# Patient Record
Sex: FEMALE | Race: WHITE | NOT HISPANIC OR LATINO | Employment: STUDENT | URBAN - METROPOLITAN AREA
[De-identification: names, ages, dates, MRNs, and addresses within clinical notes are randomized per-mention and may not be internally consistent; named-entity substitution may affect disease eponyms.]

---
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
## 2023-02-02 ENCOUNTER — PATIENT MESSAGE (OUTPATIENT)
Dept: PEDIATRICS | Facility: CLINIC | Age: 18
End: 2023-02-02
Payer: MEDICAID

## 2023-05-25 DIAGNOSIS — N92.6 IRREGULAR MENSES: ICD-10-CM

## 2023-05-25 RX ORDER — NORGESTIMATE AND ETHINYL ESTRADIOL 7DAYSX3 LO
1 KIT ORAL DAILY
Qty: 84 TABLET | Refills: 0 | Status: SHIPPED | OUTPATIENT
Start: 2023-05-25 | End: 2023-07-20 | Stop reason: SDUPTHER

## 2023-07-20 ENCOUNTER — OFFICE VISIT (OUTPATIENT)
Dept: UROGYNECOLOGY | Facility: CLINIC | Age: 18
End: 2023-07-20
Payer: MEDICAID

## 2023-07-20 VITALS
SYSTOLIC BLOOD PRESSURE: 120 MMHG | HEIGHT: 65 IN | DIASTOLIC BLOOD PRESSURE: 70 MMHG | BODY MASS INDEX: 22.77 KG/M2 | WEIGHT: 136.69 LBS

## 2023-07-20 DIAGNOSIS — Z01.419 WELL WOMAN EXAM: Primary | ICD-10-CM

## 2023-07-20 DIAGNOSIS — N92.6 IRREGULAR MENSES: ICD-10-CM

## 2023-07-20 PROCEDURE — 1159F PR MEDICATION LIST DOCUMENTED IN MEDICAL RECORD: ICD-10-PCS | Mod: CPTII,,, | Performed by: NURSE PRACTITIONER

## 2023-07-20 PROCEDURE — 1159F MED LIST DOCD IN RCRD: CPT | Mod: CPTII,,, | Performed by: NURSE PRACTITIONER

## 2023-07-20 PROCEDURE — 99395 PREV VISIT EST AGE 18-39: CPT | Mod: S$PBB,,, | Performed by: NURSE PRACTITIONER

## 2023-07-20 PROCEDURE — 3008F PR BODY MASS INDEX (BMI) DOCUMENTED: ICD-10-PCS | Mod: CPTII,,, | Performed by: NURSE PRACTITIONER

## 2023-07-20 PROCEDURE — 99999 PR PBB SHADOW E&M-EST. PATIENT-LVL III: ICD-10-PCS | Mod: PBBFAC,,, | Performed by: NURSE PRACTITIONER

## 2023-07-20 PROCEDURE — 99395 PR PREVENTIVE VISIT,EST,18-39: ICD-10-PCS | Mod: S$PBB,,, | Performed by: NURSE PRACTITIONER

## 2023-07-20 PROCEDURE — 99213 OFFICE O/P EST LOW 20 MIN: CPT | Mod: PBBFAC | Performed by: NURSE PRACTITIONER

## 2023-07-20 PROCEDURE — 3078F PR MOST RECENT DIASTOLIC BLOOD PRESSURE < 80 MM HG: ICD-10-PCS | Mod: CPTII,,, | Performed by: NURSE PRACTITIONER

## 2023-07-20 PROCEDURE — 3074F PR MOST RECENT SYSTOLIC BLOOD PRESSURE < 130 MM HG: ICD-10-PCS | Mod: CPTII,,, | Performed by: NURSE PRACTITIONER

## 2023-07-20 PROCEDURE — 1160F RVW MEDS BY RX/DR IN RCRD: CPT | Mod: CPTII,,, | Performed by: NURSE PRACTITIONER

## 2023-07-20 PROCEDURE — 1160F PR REVIEW ALL MEDS BY PRESCRIBER/CLIN PHARMACIST DOCUMENTED: ICD-10-PCS | Mod: CPTII,,, | Performed by: NURSE PRACTITIONER

## 2023-07-20 PROCEDURE — 3008F BODY MASS INDEX DOCD: CPT | Mod: CPTII,,, | Performed by: NURSE PRACTITIONER

## 2023-07-20 PROCEDURE — 3074F SYST BP LT 130 MM HG: CPT | Mod: CPTII,,, | Performed by: NURSE PRACTITIONER

## 2023-07-20 PROCEDURE — 3078F DIAST BP <80 MM HG: CPT | Mod: CPTII,,, | Performed by: NURSE PRACTITIONER

## 2023-07-20 PROCEDURE — 99999 PR PBB SHADOW E&M-EST. PATIENT-LVL III: CPT | Mod: PBBFAC,,, | Performed by: NURSE PRACTITIONER

## 2023-07-20 RX ORDER — NORGESTIMATE AND ETHINYL ESTRADIOL 7DAYSX3 LO
1 KIT ORAL DAILY
Qty: 84 TABLET | Refills: 4 | Status: SHIPPED | OUTPATIENT
Start: 2023-07-20 | End: 2023-09-26

## 2023-07-20 NOTE — PROGRESS NOTES
07/20/2023    SUBJECTIVE:   17 y.o. female for annual exam    History reviewed. No pertinent past medical history.    Past Surgical History:   Procedure Laterality Date    TONSILLECTOMY, ADENOIDECTOMY      2 years old       Current Outpatient Medications   Medication Sig Dispense Refill    ondansetron (ZOFRAN-ODT) 4 MG TbDL Take 4 mg by mouth every 8 (eight) hours as needed.      RETIN-A 0.05 % cream Apply 1 application topically nightly.      spironolactone (ALDACTONE) 25 MG tablet Take 25 mg by mouth once daily.      norgestimate-ethinyl estradioL (TRI-LO-ESTARYLLA) 0.18/0.215/0.25 mg-25 mcg tablet Take 1 tablet by mouth once daily. 84 tablet 4     No current facility-administered medications for this visit.     Allergies: Patient has no known allergies.   Patient's last menstrual period was 06/20/2023.    Menses normal on ocp's .      Prior to ocp's:Menses every 6-9 weeks lasting 5-6 days.  Denies dysmenorrhea.      Family History  Family History   Problem Relation Age of Onset    Diabetes Maternal Grandmother     Heart disease Maternal Grandmother     Hyperlipidemia Maternal Grandmother     Blindness Maternal Grandmother     Hyperlipidemia Maternal Grandfather     Glaucoma Maternal Grandfather     Cataracts Maternal Grandfather     Strabismus Brother     Blindness Maternal Aunt     Retinal detachment Paternal Uncle     Retinal detachment Paternal Grandfather     Thyroid disease Neg Hx     Stroke Neg Hx     Macular degeneration Neg Hx     Hypertension Neg Hx     Amblyopia Neg Hx         ROS:  Feeling well.   No dyspnea or chest pain on exertion.    No abdominal pain, change in bowel habits, black or bloody stools.  + constipation intermittently  No urinary tract symptoms.   GYN ROS: no breast pain or new or enlarging lumps on self exam, normal menses.   No neurological complaints.    OBJECTIVE:   The patient appears well, alert, oriented x 3, in no distress.  /70 (BP Location: Left arm, Patient Position:  "Sitting, BP Method: Medium (Manual))   Ht 5' 5" (1.651 m)   Wt 62 kg (136 lb 11 oz)   LMP 06/20/2023   BMI 22.75 kg/m²   Abdomen soft without tenderness, guarding, mass or organomegaly.   Extremities show no edema, normal peripheral pulses.   Neurological is normal, no focal findings.      BREAST EXAM: breasts appear normal, no suspicious masses, no skin or nipple changes or axillary nodes     PELVIC EXAM:   VULVA: normal appearing vulva with no masses, tenderness or lesions,   VAGINA: normal appearing vagina with normal color and discharge, no lesions,   CERVIX: normal appearing cervix without discharge or lesions,   UTERUS: uterus is normal size, shape, consistency and nontender,   ADNEXA: no masses,   RECTAL: deferred     ASSESSMENT:   1. Well woman exam        2. Irregular menses  norgestimate-ethinyl estradioL (TRI-LO-ESTARYLLA) 0.18/0.215/0.25 mg-25 mcg tablet            PLAN:       1. Irregular menses  --continue ocp's  --let me know if you would like an IUD  --will need to get urine test for gonorrhea/ chlamycia  --will time to insert when you are on your cycle    2. Constipation  --continue to control    3.  RTC 1 year for annual    25 minutes were spent in face to face time with this patient  90 % of this time was spent in counseling and/or coordination of care    Jasmyne HEMPHILL [facility]  Division of Female Pelvic Medicine and Reconstructive Surgery  Department of Obstetrics & Gynecology          "

## 2023-07-20 NOTE — PATIENT INSTRUCTIONS
1. Irregular menses  --continue ocp's  --let me know if you would like an IUD  --will need to get urine test for gonorrhea/ chlamycia  --will time to insert when you are on your cycle    2. Constipation  --continue to control    3.  RTC 1 year for annual

## 2023-09-26 ENCOUNTER — OFFICE VISIT (OUTPATIENT)
Dept: UROGYNECOLOGY | Facility: CLINIC | Age: 18
End: 2023-09-26
Payer: MEDICAID

## 2023-09-26 VITALS
DIASTOLIC BLOOD PRESSURE: 70 MMHG | HEIGHT: 65 IN | BODY MASS INDEX: 21.16 KG/M2 | WEIGHT: 127 LBS | SYSTOLIC BLOOD PRESSURE: 120 MMHG

## 2023-09-26 DIAGNOSIS — N92.1 METRORRHAGIA: Primary | ICD-10-CM

## 2023-09-26 DIAGNOSIS — K59.00 CONSTIPATION, UNSPECIFIED CONSTIPATION TYPE: ICD-10-CM

## 2023-09-26 PROCEDURE — 99213 OFFICE O/P EST LOW 20 MIN: CPT | Mod: S$PBB,,, | Performed by: NURSE PRACTITIONER

## 2023-09-26 PROCEDURE — 99999 PR PBB SHADOW E&M-EST. PATIENT-LVL III: CPT | Mod: PBBFAC,,, | Performed by: NURSE PRACTITIONER

## 2023-09-26 PROCEDURE — 1159F PR MEDICATION LIST DOCUMENTED IN MEDICAL RECORD: ICD-10-PCS | Mod: CPTII,,, | Performed by: NURSE PRACTITIONER

## 2023-09-26 PROCEDURE — 3078F PR MOST RECENT DIASTOLIC BLOOD PRESSURE < 80 MM HG: ICD-10-PCS | Mod: CPTII,,, | Performed by: NURSE PRACTITIONER

## 2023-09-26 PROCEDURE — 1159F MED LIST DOCD IN RCRD: CPT | Mod: CPTII,,, | Performed by: NURSE PRACTITIONER

## 2023-09-26 PROCEDURE — 3074F SYST BP LT 130 MM HG: CPT | Mod: CPTII,,, | Performed by: NURSE PRACTITIONER

## 2023-09-26 PROCEDURE — 3008F PR BODY MASS INDEX (BMI) DOCUMENTED: ICD-10-PCS | Mod: CPTII,,, | Performed by: NURSE PRACTITIONER

## 2023-09-26 PROCEDURE — 3008F BODY MASS INDEX DOCD: CPT | Mod: CPTII,,, | Performed by: NURSE PRACTITIONER

## 2023-09-26 PROCEDURE — 1160F PR REVIEW ALL MEDS BY PRESCRIBER/CLIN PHARMACIST DOCUMENTED: ICD-10-PCS | Mod: CPTII,,, | Performed by: NURSE PRACTITIONER

## 2023-09-26 PROCEDURE — 3074F PR MOST RECENT SYSTOLIC BLOOD PRESSURE < 130 MM HG: ICD-10-PCS | Mod: CPTII,,, | Performed by: NURSE PRACTITIONER

## 2023-09-26 PROCEDURE — 99213 OFFICE O/P EST LOW 20 MIN: CPT | Mod: PBBFAC | Performed by: NURSE PRACTITIONER

## 2023-09-26 PROCEDURE — 1160F RVW MEDS BY RX/DR IN RCRD: CPT | Mod: CPTII,,, | Performed by: NURSE PRACTITIONER

## 2023-09-26 PROCEDURE — 99213 PR OFFICE/OUTPT VISIT, EST, LEVL III, 20-29 MIN: ICD-10-PCS | Mod: S$PBB,,, | Performed by: NURSE PRACTITIONER

## 2023-09-26 PROCEDURE — 3078F DIAST BP <80 MM HG: CPT | Mod: CPTII,,, | Performed by: NURSE PRACTITIONER

## 2023-09-26 PROCEDURE — 99999 PR PBB SHADOW E&M-EST. PATIENT-LVL III: ICD-10-PCS | Mod: PBBFAC,,, | Performed by: NURSE PRACTITIONER

## 2023-09-26 RX ORDER — LEVONORGESTREL AND ETHINYL ESTRADIOL 0.1-0.02MG
1 KIT ORAL DAILY
Qty: 84 TABLET | Refills: 3 | Status: SHIPPED | OUTPATIENT
Start: 2023-09-26 | End: 2024-09-25

## 2023-09-26 NOTE — PROGRESS NOTES
09/26/2023    SUBJECTIVE:   17 y.o. female for metrorrhagia despite ocp's    History reviewed. No pertinent past medical history.    Past Surgical History:   Procedure Laterality Date    TONSILLECTOMY, ADENOIDECTOMY      2 years old       Current Outpatient Medications   Medication Sig Dispense Refill    ondansetron (ZOFRAN-ODT) 4 MG TbDL Take 4 mg by mouth every 8 (eight) hours as needed.      levonorgestrel-ethinyl estradiol (AVIANE,ALESSE,LESSINA) 0.1-20 mg-mcg per tablet Take 1 tablet by mouth once daily. 84 tablet 3    RETIN-A 0.05 % cream Apply 1 application topically nightly.      spironolactone (ALDACTONE) 25 MG tablet Take 25 mg by mouth once daily.       No current facility-administered medications for this visit.     Allergies: Patient has no known allergies.   Patient's last menstrual period was 09/10/2023.    Menses normal on ocp's .      Prior to ocp's:Menses every 6-9 weeks lasting 5-6 days.+ metrorrhagia x firt 3 days (super plus tampon in 45 minutes)      Family History  Family History   Problem Relation Age of Onset    Diabetes Maternal Grandmother     Heart disease Maternal Grandmother     Hyperlipidemia Maternal Grandmother     Blindness Maternal Grandmother     Hyperlipidemia Maternal Grandfather     Glaucoma Maternal Grandfather     Cataracts Maternal Grandfather     Strabismus Brother     Blindness Maternal Aunt     Retinal detachment Paternal Uncle     Retinal detachment Paternal Grandfather     Thyroid disease Neg Hx     Stroke Neg Hx     Macular degeneration Neg Hx     Hypertension Neg Hx     Amblyopia Neg Hx         ROS:  Feeling well.   No dyspnea or chest pain on exertion.    No abdominal pain, change in bowel habits, black or bloody stools.  + constipation intermittently  No urinary tract symptoms.   GYN ROS: no breast pain or new or enlarging lumps on self exam, metrorrhagia  No neurological complaints.    OBJECTIVE:   The patient appears well, alert, oriented x 3, in no  "distress.  /70 (BP Location: Left arm, Patient Position: Sitting, BP Method: Medium (Manual))   Ht 5' 5" (1.651 m)   Wt 57.6 kg (126 lb 15.8 oz)   LMP 09/10/2023   BMI 21.13 kg/m²   Abdomen soft without tenderness, guarding, mass or organomegaly.   Extremities show no edema, normal peripheral pulses.   Neurological is normal, no focal findings.          PELVIC EXAM: deferred    ASSESSMENT:   1. Metrorrhagia  levonorgestrel-ethinyl estradiol (AVIANE,ALESSE,LESSINA) 0.1-20 mg-mcg per tablet      2. Constipation, unspecified constipation type                PLAN:       1. Irregular menses  --trial of levonorgestrel-ethinyl estradiol (AVIANE,ALESSE,LESSINA--start with next cycle  --had heavy bleeding with norgestimate/ ethinyl estradiol  --should avoid norethindrone or desogestrol due to acne side effects    2. Constipation  --continue to control    3.  RTC 1 year for annual    I spent a total of 20 minutes on the day of the visit.  This includes face to face time and non-face to face time preparing to see the patient (eg, review of tests), obtaining and/or reviewing separately obtained history, documenting clinical information in the electronic or other health record, independently interpreting results and communicating results to the patient/family/caregiver, or care coordinator.     Jasmyne HEMPHILL [facility]  Division of Female Pelvic Medicine and Reconstructive Surgery  Department of Obstetrics & Gynecology        "

## 2023-09-26 NOTE — PATIENT INSTRUCTIONS
1. Irregular menses  --trial of levonorgestrel-ethinyl estradiol (AVIANE,ALESSE,LESSINA--start with next cycle  --had heavy bleeding with norgestimate/ ethinyl estradiol  --should avoid norethindrone or desogestrol due to acne side effects    2. Constipation  --continue to control    3.  RTC 1 year for annual